# Patient Record
Sex: FEMALE | Race: WHITE | NOT HISPANIC OR LATINO
[De-identification: names, ages, dates, MRNs, and addresses within clinical notes are randomized per-mention and may not be internally consistent; named-entity substitution may affect disease eponyms.]

---

## 2017-07-26 ENCOUNTER — APPOINTMENT (OUTPATIENT)
Dept: OBGYN | Facility: CLINIC | Age: 47
End: 2017-07-26

## 2017-07-26 VITALS
SYSTOLIC BLOOD PRESSURE: 112 MMHG | HEIGHT: 66 IN | DIASTOLIC BLOOD PRESSURE: 74 MMHG | BODY MASS INDEX: 30.73 KG/M2 | WEIGHT: 191.2 LBS

## 2017-07-31 LAB
C TRACH RRNA SPEC QL NAA+PROBE: NORMAL
CYTOLOGY CVX/VAG DOC THIN PREP: NORMAL
HPV HIGH+LOW RISK DNA PNL CVX: NEGATIVE
N GONORRHOEA RRNA SPEC QL NAA+PROBE: NORMAL
SOURCE TP AMPLIFICATION: NORMAL

## 2017-08-16 DIAGNOSIS — N60.01 SOLITARY CYST OF RIGHT BREAST: ICD-10-CM

## 2018-07-25 ENCOUNTER — APPOINTMENT (OUTPATIENT)
Dept: OBGYN | Facility: CLINIC | Age: 48
End: 2018-07-25
Payer: COMMERCIAL

## 2018-07-25 VITALS
DIASTOLIC BLOOD PRESSURE: 60 MMHG | HEIGHT: 66 IN | SYSTOLIC BLOOD PRESSURE: 100 MMHG | BODY MASS INDEX: 31.18 KG/M2 | WEIGHT: 194 LBS

## 2018-07-25 DIAGNOSIS — Z80.3 FAMILY HISTORY OF MALIGNANT NEOPLASM OF BREAST: ICD-10-CM

## 2018-07-25 LAB
DATE COLLECTED: NORMAL
HEMOCCULT SP1 STL QL: NEGATIVE
QUALITY CONTROL: YES

## 2018-07-25 PROCEDURE — 99396 PREV VISIT EST AGE 40-64: CPT

## 2018-08-02 LAB
C TRACH RRNA SPEC QL NAA+PROBE: NOT DETECTED
CYTOLOGY CVX/VAG DOC THIN PREP: NORMAL
HPV HIGH+LOW RISK DNA PNL CVX: NOT DETECTED
N GONORRHOEA RRNA SPEC QL NAA+PROBE: NOT DETECTED
SOURCE TP AMPLIFICATION: NORMAL

## 2018-08-14 ENCOUNTER — OTHER (OUTPATIENT)
Age: 48
End: 2018-08-14

## 2018-08-15 ENCOUNTER — OTHER (OUTPATIENT)
Age: 48
End: 2018-08-15

## 2018-09-19 ENCOUNTER — APPOINTMENT (OUTPATIENT)
Dept: UROGYNECOLOGY | Facility: CLINIC | Age: 48
End: 2018-09-19

## 2019-06-27 ENCOUNTER — OTHER (OUTPATIENT)
Age: 49
End: 2019-06-27

## 2019-07-29 ENCOUNTER — APPOINTMENT (OUTPATIENT)
Dept: OBGYN | Facility: CLINIC | Age: 49
End: 2019-07-29
Payer: COMMERCIAL

## 2019-07-29 VITALS
WEIGHT: 173.04 LBS | HEIGHT: 65 IN | DIASTOLIC BLOOD PRESSURE: 79 MMHG | SYSTOLIC BLOOD PRESSURE: 110 MMHG | BODY MASS INDEX: 28.83 KG/M2

## 2019-07-29 LAB
HCG UR QL: NEGATIVE
QUALITY CONTROL: YES

## 2019-07-29 PROCEDURE — 81025 URINE PREGNANCY TEST: CPT

## 2019-07-29 PROCEDURE — 36415 COLL VENOUS BLD VENIPUNCTURE: CPT

## 2019-07-29 PROCEDURE — 99396 PREV VISIT EST AGE 40-64: CPT

## 2019-07-29 PROCEDURE — 99214 OFFICE O/P EST MOD 30 MIN: CPT | Mod: 25

## 2019-07-29 NOTE — REVIEW OF SYSTEMS
[Incontinence] : incontinence [Arthralgias] : arthralgias [Joint Pain] : joint pain [Nl] : Integumentary [FreeTextEntry1] : referred to Dr. Bernal

## 2019-07-29 NOTE — PHYSICAL EXAM
[Awake] : awake [Alert] : alert [Soft] : soft [Oriented x3] : oriented to person, place, and time [Normal] : cervix [No Bleeding] : there was no active vaginal bleeding [Labia Majora] : labia major [Labia Minora] : labia minora [Normal Position] : in a normal position [Uterine Adnexae] : were not tender and not enlarged [Acute Distress] : no acute distress [LAD] : no lymphadenopathy [Thyroid Nodule] : no thyroid nodule [Goiter] : no goiter [Mass] : no breast mass [Nipple Discharge] : no nipple discharge [Axillary LAD] : no axillary lymphadenopathy [Tender] : non tender [Distended] : not distended [H/Smegaly] : no hepatosplenomegaly [Depressed Mood] : not depressed [Flat Affect] : affect not flat [Tenderness] : nontender [Enlarged ___ wks] : not enlarged [Mass ___ cm] : no uterine mass was palpated [Adnexa Tenderness] : were not tender [Ovarian Mass (___ Cm)] : there were no adnexal masses [de-identified] : breast exam: supine and upright [FreeTextEntry9] : rectal deferred

## 2019-08-12 DIAGNOSIS — R92.1 MAMMOGRAPHIC CALCIFICATION FOUND ON DIAGNOSTIC IMAGING OF BREAST: ICD-10-CM

## 2019-08-19 DIAGNOSIS — Z13.71 ENCOUNTER FOR NONPROCREATIVE SCREENING FOR GENETIC DISEASE CARRIER STATUS: ICD-10-CM

## 2020-07-22 ENCOUNTER — APPOINTMENT (OUTPATIENT)
Dept: OBGYN | Facility: CLINIC | Age: 50
End: 2020-07-22
Payer: COMMERCIAL

## 2020-07-22 VITALS
BODY MASS INDEX: 25.39 KG/M2 | HEIGHT: 66 IN | WEIGHT: 158 LBS | DIASTOLIC BLOOD PRESSURE: 78 MMHG | SYSTOLIC BLOOD PRESSURE: 116 MMHG

## 2020-07-22 PROCEDURE — 99396 PREV VISIT EST AGE 40-64: CPT

## 2020-07-22 NOTE — PHYSICAL EXAM
[Alert] : alert [Awake] : awake [Soft] : soft [Oriented x3] : oriented to person, place, and time [Normal] : uterus [Labia Majora] : labia major [No Bleeding] : there was no active vaginal bleeding [Labia Minora] : labia minora [Normal Position] : in a normal position [Uterine Adnexae] : were not tender and not enlarged [Acute Distress] : no acute distress [Thyroid Nodule] : no thyroid nodule [LAD] : no lymphadenopathy [Goiter] : no goiter [Mass] : no breast mass [Nipple Discharge] : no nipple discharge [Axillary LAD] : no axillary lymphadenopathy [Distended] : not distended [Tender] : non tender [Depressed Mood] : not depressed [H/Smegaly] : no hepatosplenomegaly [Flat Affect] : affect not flat [Enlarged ___ wks] : not enlarged [Tenderness] : nontender [Mass ___ cm] : no uterine mass was palpated [Adnexa Tenderness] : were not tender [Ovarian Mass (___ Cm)] : there were no adnexal masses [FreeTextEntry9] : rectal deferred [de-identified] : breast exam: supine and upright

## 2021-08-10 ENCOUNTER — APPOINTMENT (OUTPATIENT)
Dept: OBGYN | Facility: CLINIC | Age: 51
End: 2021-08-10
Payer: COMMERCIAL

## 2021-08-10 ENCOUNTER — NON-APPOINTMENT (OUTPATIENT)
Age: 51
End: 2021-08-10

## 2021-08-10 VITALS
BODY MASS INDEX: 25.55 KG/M2 | HEIGHT: 66 IN | WEIGHT: 159 LBS | DIASTOLIC BLOOD PRESSURE: 76 MMHG | SYSTOLIC BLOOD PRESSURE: 124 MMHG

## 2021-08-10 LAB
HCG UR QL: NEGATIVE
QUALITY CONTROL: YES

## 2021-08-10 PROCEDURE — 99396 PREV VISIT EST AGE 40-64: CPT

## 2021-08-10 PROCEDURE — 81025 URINE PREGNANCY TEST: CPT

## 2021-08-10 NOTE — PHYSICAL EXAM
[Awake] : awake [Alert] : alert [Soft] : soft [Oriented x3] : oriented to person, place, and time [Normal] : uterus [Labia Majora] : labia major [Labia Minora] : labia minora [No Bleeding] : there was no active vaginal bleeding [Normal Position] : in a normal position [Uterine Adnexae] : were not tender and not enlarged [Acute Distress] : no acute distress [LAD] : no lymphadenopathy [Thyroid Nodule] : no thyroid nodule [Goiter] : no goiter [Mass] : no breast mass [Nipple Discharge] : no nipple discharge [Axillary LAD] : no axillary lymphadenopathy [Tender] : non tender [Distended] : not distended [H/Smegaly] : no hepatosplenomegaly [Depressed Mood] : not depressed [Flat Affect] : affect not flat [Tenderness] : nontender [Enlarged ___ wks] : not enlarged [Mass ___ cm] : no uterine mass was palpated [Adnexa Tenderness] : were not tender [Ovarian Mass (___ Cm)] : there were no adnexal masses [de-identified] : breast exam: supine and upright [FreeTextEntry9] : rectal deferred

## 2022-07-21 ENCOUNTER — APPOINTMENT (OUTPATIENT)
Dept: OBGYN | Facility: CLINIC | Age: 52
End: 2022-07-21

## 2022-07-21 ENCOUNTER — NON-APPOINTMENT (OUTPATIENT)
Age: 52
End: 2022-07-21

## 2022-07-21 VITALS
DIASTOLIC BLOOD PRESSURE: 68 MMHG | SYSTOLIC BLOOD PRESSURE: 122 MMHG | WEIGHT: 157 LBS | BODY MASS INDEX: 25.23 KG/M2 | HEIGHT: 66 IN

## 2022-07-21 DIAGNOSIS — R92.2 INCONCLUSIVE MAMMOGRAM: ICD-10-CM

## 2022-07-21 DIAGNOSIS — Z12.31 ENCOUNTER FOR SCREENING MAMMOGRAM FOR MALIGNANT NEOPLASM OF BREAST: ICD-10-CM

## 2022-07-21 DIAGNOSIS — N76.2 ACUTE VULVITIS: ICD-10-CM

## 2022-07-21 DIAGNOSIS — Z12.11 ENCOUNTER FOR SCREENING FOR MALIGNANT NEOPLASM OF COLON: ICD-10-CM

## 2022-07-21 PROCEDURE — 99396 PREV VISIT EST AGE 40-64: CPT

## 2022-07-21 RX ORDER — NYSTATIN AND TRIAMCINOLONE ACETONIDE 100000; 1 MG/G; MG/G
100000-0.1 CREAM TOPICAL TWICE DAILY
Qty: 1 | Refills: 0 | Status: DISCONTINUED | COMMUNITY
Start: 2018-07-25 | End: 2022-07-21

## 2022-07-21 RX ORDER — MESALAMINE 1.2 G/1
1.2 TABLET, DELAYED RELEASE ORAL
Refills: 0 | Status: ACTIVE | COMMUNITY

## 2022-07-21 RX ORDER — SUMATRIPTAN 50 MG/1
50 TABLET, FILM COATED ORAL
Qty: 36 | Refills: 0 | Status: ACTIVE | COMMUNITY
Start: 2022-03-12

## 2022-07-21 RX ORDER — ROSUVASTATIN CALCIUM 5 MG/1
5 TABLET, FILM COATED ORAL
Refills: 0 | Status: ACTIVE | COMMUNITY

## 2022-07-22 LAB — HPV HIGH+LOW RISK DNA PNL CVX: NOT DETECTED

## 2022-07-25 PROBLEM — Z12.11 SCREENING FOR COLON CANCER: Status: RESOLVED | Noted: 2018-07-25 | Resolved: 2022-07-25

## 2022-07-25 NOTE — HISTORY OF PRESENT ILLNESS
[N] : Patient does not use contraception [Y] : Positive pregnancy history [Menarche Age: ____] : age at menarche was [unfilled] [No] : Patient does not have concerns regarding sex [Previously active] : previously active [Men] : men [Mammogramdate] : 10/11/21 [TextBox_19] : BR2 [Papeardate] : 08/10/21 [ColonoscopyDate] : 2021 [GonorrheaDate] : 08/10/21 [TextBox_63] : NEG [ChlamydiaDate] : 08/10/21 [TextBox_68] : NEG [HPVDate] : 08/10/21 [TextBox_78] : NEG [LMPDate] : 06/26/22 [PGHxTotal] : 2 [[facility] [[facility]] : 2 [FreeTextEntry1] : 06/26/22

## 2022-07-25 NOTE — DISCUSSION/SUMMARY
[FreeTextEntry1] : Benign breast and pelvic exam. Pap done today.\par \par Prescription for mammogram screening and breast US given. I recommended to receive her breast imaging at a [facility]. \par \par Self-breast exam reviewed.\par \par She will follow up annually and as needed.\par

## 2022-07-25 NOTE — END OF VISIT
[FreeTextEntry3] : I, Demetrio Mejia solely acted as scribe for Dr. Sweta Matute on 07/21/2022 \par All medical entries made by the Scribe were at my, Dr. Matute’s, direction and personally\par dictated by me on 07/21/2022 . I have reviewed the chart and agree that the record\par accurately reflects my personal performance of the history, physical exam, assessment and plan. I\par have also personally directed, reviewed, and agreed with the chart.

## 2022-07-25 NOTE — PHYSICAL EXAM
[Chaperone Present] : A chaperone was present in the examining room during all aspects of the physical examination [Appropriately responsive] : appropriately responsive [Alert] : alert [No Acute Distress] : no acute distress [Soft] : soft [Non-tender] : non-tender [Non-distended] : non-distended [Oriented x3] : oriented x3 [Examination Of The Breasts] : a normal appearance [No Discharge] : no discharge [No Masses] : no breast masses were palpable [Labia Majora] : normal [Labia Minora] : normal [No Bleeding] : There was no active vaginal bleeding [Normal] : normal [Uterine Adnexae] : normal [FreeTextEntry1] : MOA: Yessi SESAY

## 2022-07-26 ENCOUNTER — NON-APPOINTMENT (OUTPATIENT)
Age: 52
End: 2022-07-26

## 2022-08-06 LAB — CYTOLOGY CVX/VAG DOC THIN PREP: NORMAL

## 2023-05-09 ENCOUNTER — OFFICE (OUTPATIENT)
Dept: URBAN - METROPOLITAN AREA CLINIC 1 | Facility: CLINIC | Age: 53
Setting detail: OPHTHALMOLOGY
End: 2023-05-09
Payer: COMMERCIAL

## 2023-05-09 DIAGNOSIS — H52.4: ICD-10-CM

## 2023-05-09 DIAGNOSIS — H17.9: ICD-10-CM

## 2023-05-09 DIAGNOSIS — H16.222: ICD-10-CM

## 2023-05-09 PROCEDURE — 92014 COMPRE OPH EXAM EST PT 1/>: CPT

## 2023-05-09 PROCEDURE — 92015 DETERMINE REFRACTIVE STATE: CPT

## 2023-05-09 ASSESSMENT — REFRACTION_AUTOREFRACTION
OD_AXIS: 094
OS_CYLINDER: -0.50
OS_SPHERE: +0.25
OD_CYLINDER: -1.00
OS_AXIS: 097
OD_SPHERE: +1.25

## 2023-05-09 ASSESSMENT — REFRACTION_MANIFEST
OD_CYLINDER: -0.50
OS_AXIS: 95
OS_SPHERE: PLANO
OS_CYLINDER: -0.50
OS_CYLINDER: SPHERE
OD_SPHERE: PLANO
OS_ADD: +1.75
OS_SPHERE: PLANO
OS_VA1: 20/20
OD_AXIS: 95
OD_ADD: +1.75
OD_ADD: +2.00
OS_VA1: 20/20
OD_CYLINDER: SPHERE
OD_VA1: 20/20
OS_ADD: +2.00
OD_SPHERE: +0.50
OD_VA1: 20/20

## 2023-05-09 ASSESSMENT — AXIALLENGTH_DERIVED
OD_AL: 23.4384
OS_AL: 23.777
OD_AL: 23.6323

## 2023-05-09 ASSESSMENT — CONFRONTATIONAL VISUAL FIELD TEST (CVF)
OS_FINDINGS: FULL
OD_FINDINGS: FULL

## 2023-05-09 ASSESSMENT — KERATOMETRY
OD_AXISANGLE_DEGREES: 002
OD_K2POWER_DIOPTERS: 43.50
OD_K1POWER_DIOPTERS: 42.75
OS_AXISANGLE_DEGREES: 171
OS_K1POWER_DIOPTERS: 42.50
OS_K2POWER_DIOPTERS: 43.50

## 2023-05-09 ASSESSMENT — SPHEQUIV_DERIVED
OS_SPHEQUIV: 0
OD_SPHEQUIV: 0.75
OD_SPHEQUIV: 0.25

## 2023-05-09 ASSESSMENT — VISUAL ACUITY
OD_BCVA: 20/25-
OS_BCVA: 20/25

## 2023-05-09 ASSESSMENT — SUPERFICIAL PUNCTATE KERATITIS (SPK): OS_SPK: T

## 2023-06-21 PROBLEM — Z79.52 LONG TERM SYSTEMIC STEROID USER: Status: RESOLVED | Noted: 2023-06-21 | Resolved: 2023-06-21

## 2023-06-21 PROBLEM — Z83.511 FAMILY HISTORY OF GLAUCOMA: Status: ACTIVE | Noted: 2023-06-21

## 2023-06-21 PROBLEM — Z87.828 HISTORY OF BACK INJURY: Status: RESOLVED | Noted: 2023-06-21 | Resolved: 2023-06-21

## 2023-06-21 PROBLEM — Z87.09 HISTORY OF BRONCHITIS: Status: RESOLVED | Noted: 2023-06-21 | Resolved: 2023-06-21

## 2023-06-21 PROBLEM — Z82.3 FAMILY HISTORY OF CEREBROVASCULAR ACCIDENT (CVA): Status: ACTIVE | Noted: 2023-06-21

## 2023-06-27 ENCOUNTER — RESULT CHARGE (OUTPATIENT)
Age: 53
End: 2023-06-27

## 2023-06-27 ENCOUNTER — APPOINTMENT (OUTPATIENT)
Dept: UROGYNECOLOGY | Facility: CLINIC | Age: 53
End: 2023-06-27
Payer: COMMERCIAL

## 2023-06-27 VITALS
HEIGHT: 66 IN | DIASTOLIC BLOOD PRESSURE: 70 MMHG | SYSTOLIC BLOOD PRESSURE: 108 MMHG | BODY MASS INDEX: 25.23 KG/M2 | WEIGHT: 157 LBS

## 2023-06-27 DIAGNOSIS — Z87.828 PERSONAL HISTORY OF OTHER (HEALED) PHYSICAL INJURY AND TRAUMA: ICD-10-CM

## 2023-06-27 DIAGNOSIS — Z87.09 PERSONAL HISTORY OF OTHER DISEASES OF THE RESPIRATORY SYSTEM: ICD-10-CM

## 2023-06-27 DIAGNOSIS — N36.42 INTRINSIC SPHINCTER DEFICIENCY (ISD): ICD-10-CM

## 2023-06-27 DIAGNOSIS — Z82.3 FAMILY HISTORY OF STROKE: ICD-10-CM

## 2023-06-27 DIAGNOSIS — Z83.511 FAMILY HISTORY OF GLAUCOMA: ICD-10-CM

## 2023-06-27 DIAGNOSIS — Z79.52 LONG TERM (CURRENT) USE OF SYSTEMIC STEROIDS: ICD-10-CM

## 2023-06-27 LAB
BILIRUB UR QL STRIP: NEGATIVE
CLARITY UR: CLEAR
COLLECTION METHOD: NORMAL
GLUCOSE UR-MCNC: NEGATIVE
HCG UR QL: 0.2 EU/DL
HGB UR QL STRIP.AUTO: NEGATIVE
KETONES UR-MCNC: NEGATIVE
LEUKOCYTE ESTERASE UR QL STRIP: NEGATIVE
NITRITE UR QL STRIP: NEGATIVE
PH UR STRIP: 5.5
PROT UR STRIP-MCNC: NEGATIVE
SP GR UR STRIP: 1.01

## 2023-06-27 PROCEDURE — 99204 OFFICE O/P NEW MOD 45 MIN: CPT | Mod: 25

## 2023-06-27 PROCEDURE — 99214 OFFICE O/P EST MOD 30 MIN: CPT | Mod: 25

## 2023-06-27 PROCEDURE — 51701 INSERT BLADDER CATHETER: CPT | Mod: 59

## 2023-06-27 PROCEDURE — 81003 URINALYSIS AUTO W/O SCOPE: CPT | Mod: QW

## 2023-06-27 NOTE — PHYSICAL EXAM
[Chaperone Present] : A chaperone was present in the examining room during all aspects of the physical examination [No Acute Distress] : in no acute distress [Well developed] : well developed [Well Nourished] : ~L well nourished [Oriented x3] : oriented to person, place, and time [Normal Memory] : ~T memory was ~L unimpaired [Normal Mood/Affect] : mood and affect are normal [No Edema] : ~T edema was not present [None] : no CVA tenderness [Warm and Dry] : was warm and dry to touch [Normal Gait] : gait was normal [Labia Majora] : were normal [Labia Minora] : were normal [Normal Appearance] : general appearance was normal [2] : 2 [Normal] : no abnormalities [Post Void Residual ____ml] : post void residual was [unfilled] ml [Cough] : no cough [Tenderness] : ~T no ~M abdominal tenderness observed [Distended] : not distended [Inguinal LAD] : no adenopathy was noted in the inguinal lymph nodes [FreeTextEntry3] : positive CST [de-identified] : no POP

## 2023-06-27 NOTE — REASON FOR VISIT
[Questionnaire Received] : Patient questionnaire received [Urinary Incontinence] : urinary incontinence [Initial Visit ___] : an initial visit for [unfilled]

## 2023-06-27 NOTE — PROCEDURE
[Straight Catheterization] : insertion of a straight catheter [Urinary Frequency] : urinary frequency [Patient] : the patient [___ Fr Straight Tip] : a [unfilled] in Hungarian straight tip catheter [None] : there were no complications with the catheter insertion [Clear] : clear [No Complications] : no complications [Tolerated Well] : the patient tolerated the procedure well [Post procedure instructions and information given] : Post procedure instructions and information were given and reviewed with patient. [0] : 0

## 2023-06-27 NOTE — DISCUSSION/SUMMARY
[FreeTextEntry1] : \par Allyson presents for eval of DEB, positive CST, no POP, normal PVR.  We discussed management options including observation, pelvic floor exercises with or without physical therapy, pessary, impressa, medications including imipramine and surgical management including urethral bulking agents, fascial sling, fish and midurethral sling with mesh. IUGA DEB/PFE given to her.\par \par [] UDS\par [] sling/cysto\par

## 2023-06-27 NOTE — HISTORY OF PRESENT ILLNESS
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

## 2023-07-13 ENCOUNTER — APPOINTMENT (OUTPATIENT)
Dept: UROGYNECOLOGY | Facility: CLINIC | Age: 53
End: 2023-07-13
Payer: COMMERCIAL

## 2023-07-13 PROCEDURE — 51784 ANAL/URINARY MUSCLE STUDY: CPT

## 2023-07-13 PROCEDURE — 51741 ELECTRO-UROFLOWMETRY FIRST: CPT

## 2023-07-13 PROCEDURE — 51797 INTRAABDOMINAL PRESSURE TEST: CPT

## 2023-07-13 PROCEDURE — 51729 CYSTOMETROGRAM W/VP&UP: CPT

## 2023-07-18 ENCOUNTER — APPOINTMENT (OUTPATIENT)
Dept: UROGYNECOLOGY | Facility: CLINIC | Age: 53
End: 2023-07-18
Payer: COMMERCIAL

## 2023-07-18 DIAGNOSIS — N39.3 STRESS INCONTINENCE (FEMALE) (MALE): ICD-10-CM

## 2023-07-18 PROCEDURE — 99214 OFFICE O/P EST MOD 30 MIN: CPT

## 2023-07-18 NOTE — HISTORY OF PRESENT ILLNESS
[Vaginal Wall Prolapse] : no [Rectal Prolapse] : no [Unable To Restrain Bowel Movement] : mild [x1] : nocturia once nightly [Feelings Of Urinary Urgency] : no [Pain During Urination (Dysuria)] : no [Urinary Tract Infection] : moderate [Constipation Obstructed Defecation] : no [] : no [Incomplete Emptying Of Stool] : no [Pelvic Pain] : no [Vaginal Pain] : no [Vulvar Pain] : no [FreeTextEntry1] : \par Allyson presents for eval of DEB\par UDS with DEB\par desires surgery

## 2023-07-18 NOTE — DISCUSSION/SUMMARY
[FreeTextEntry1] : \par Allyson presents for eval of DEB.\par \par The patient presented to the office today for counseling regarding her decision for surgical treatment of her urinary incontinence. All pertinent prior studies including urodynamic testing were reviewed. 						\par The patient was counseled regarding alternative non-surgical therapies as well as the prognosis with no intervention.\par \par The patient was advised regarding various surgical options including abdominal, laparoscopic, transurethral and vaginal approaches, allograft (harvesting one’s own tissue), xenograft (using tissue from donor) and synthetic grafts.  The risks and benefits of surgery using graft insertion (mesh) were fully reviewed.  She was informed of the potential for improved durability and the inherent risk of mesh use including but not limited to infection, erosion and chronic inflammation, acute and chronic pain, pain with intercourse (both of which may be refractory to treatment) fistula, disturbance in bladder function, any of which may require additional surgery for mesh revision.  She is aware that the mesh used in her surgery is a permanent mesh.  The patient was advised regarding the July 2011 FDA notification regarding these issues and provided the website address for further reference www.fda.gov.  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  Appropriate informed consent was obtained.  All questions were answered to the patient’s satisfaction.\par \par Preoperative labs ordered including CBC/BMP/PT/PT/INR\par IUGA MUS given\par \par [] consent for pelvic EUA, sling/cysto, any other indicated procedures\par

## 2023-07-18 NOTE — PHYSICAL EXAM
[Chaperone Present] : A chaperone was present in the examining room during all aspects of the physical examination [Cough] : no cough

## 2023-08-03 ENCOUNTER — NON-APPOINTMENT (OUTPATIENT)
Age: 53
End: 2023-08-03

## 2023-08-03 ENCOUNTER — APPOINTMENT (OUTPATIENT)
Dept: OBGYN | Facility: CLINIC | Age: 53
End: 2023-08-03
Payer: COMMERCIAL

## 2023-08-03 VITALS
SYSTOLIC BLOOD PRESSURE: 118 MMHG | WEIGHT: 160 LBS | BODY MASS INDEX: 25.71 KG/M2 | HEIGHT: 66 IN | DIASTOLIC BLOOD PRESSURE: 68 MMHG

## 2023-08-03 DIAGNOSIS — N64.9 DISORDER OF BREAST, UNSPECIFIED: ICD-10-CM

## 2023-08-03 DIAGNOSIS — N63.0 UNSPECIFIED LUMP IN UNSPECIFIED BREAST: ICD-10-CM

## 2023-08-03 DIAGNOSIS — Z13.820 ENCOUNTER FOR SCREENING FOR OSTEOPOROSIS: ICD-10-CM

## 2023-08-03 DIAGNOSIS — R92.2 INCONCLUSIVE MAMMOGRAM: ICD-10-CM

## 2023-08-03 DIAGNOSIS — Z01.419 ENCOUNTER FOR GYNECOLOGICAL EXAMINATION (GENERAL) (ROUTINE) W/OUT ABNORMAL FINDINGS: ICD-10-CM

## 2023-08-03 DIAGNOSIS — Z12.31 ENCOUNTER FOR SCREENING MAMMOGRAM FOR MALIGNANT NEOPLASM OF BREAST: ICD-10-CM

## 2023-08-03 PROCEDURE — 99396 PREV VISIT EST AGE 40-64: CPT

## 2023-08-04 ENCOUNTER — OFFICE (OUTPATIENT)
Dept: URBAN - METROPOLITAN AREA CLINIC 1 | Facility: CLINIC | Age: 53
Setting detail: OPHTHALMOLOGY
End: 2023-08-04
Payer: COMMERCIAL

## 2023-08-04 DIAGNOSIS — T15.12XA: ICD-10-CM

## 2023-08-04 DIAGNOSIS — H16.222: ICD-10-CM

## 2023-08-04 PROBLEM — N63.0 BREAST NODULE: Status: RESOLVED | Noted: 2018-02-26 | Resolved: 2023-08-04

## 2023-08-04 PROBLEM — N64.9 BREAST LESION: Status: RESOLVED | Noted: 2018-08-14 | Resolved: 2023-08-04

## 2023-08-04 LAB — HPV HIGH+LOW RISK DNA PNL CVX: NOT DETECTED

## 2023-08-04 PROCEDURE — 65205 REMOVE FOREIGN BODY FROM EYE: CPT

## 2023-08-04 PROCEDURE — 99203 OFFICE O/P NEW LOW 30 MIN: CPT

## 2023-08-04 ASSESSMENT — KERATOMETRY
OS_K1POWER_DIOPTERS: 42.75
OS_K2POWER_DIOPTERS: 44.00
OD_K1POWER_DIOPTERS: 43.00
OS_AXISANGLE_DEGREES: 150
OD_AXISANGLE_DEGREES: 175
OD_K2POWER_DIOPTERS: 43.50

## 2023-08-04 ASSESSMENT — CONFRONTATIONAL VISUAL FIELD TEST (CVF)
OD_FINDINGS: FULL
OS_FINDINGS: FULL

## 2023-08-04 ASSESSMENT — VISUAL ACUITY
OS_BCVA: 20/20-
OD_BCVA: 20/20-1

## 2023-08-04 ASSESSMENT — SUPERFICIAL PUNCTATE KERATITIS (SPK): OS_SPK: 2+

## 2023-08-04 NOTE — PHYSICAL EXAM
[Appropriately responsive] : appropriately responsive [Alert] : alert [No Acute Distress] : no acute distress [Oriented x3] : oriented x3 [Examination Of The Breasts] : a normal appearance [No Discharge] : no discharge [No Masses] : no breast masses were palpable [Labia Majora] : normal [Labia Minora] : normal [No Bleeding] : There was no active vaginal bleeding [Normal] : normal [Uterine Adnexae] : normal [Soft] : soft [Non-tender] : non-tender [Non-distended] : non-distended [Atrophy] : atrophy [Dry Mucosa] : dry mucosa

## 2023-08-04 NOTE — END OF VISIT
[FreeTextEntry3] : I, Porsche Aguilar, solely acted as scribe for Dr. Sweta Matute on 08/03/2023. All medical entries made by the Scribe were at my, Dr. Matute's, direction and personally dictated by me on 08/03/2023. I have reviewed the chart and agree that the record accurately reflects my personal performance of the history, physical exam, assessment and plan. I have also personally directed, reviewed, and agreed with the chart.

## 2023-08-04 NOTE — DISCUSSION/SUMMARY
[FreeTextEntry1] : Pap done today.  Consider vaginal estrogen secondary to incontinence and painful sex/vaginal dryness. SHe will discuss with Dr RAUSCH and consider for after her surgery.  Prescription for mammogram screening, breast US, and DEXA scan given.  Recommended calcium and vitamin D supplement.  Self-breast exam reviewed.  She will follow up annually and as needed.

## 2023-08-04 NOTE — HISTORY OF PRESENT ILLNESS
[Patient reported bone density results were normal] : Patient reported bone density results were normal [N] : Patient does not use contraception [Y] : Positive pregnancy history [Menarche Age: ____] : age at menarche was [unfilled] [No] : Patient does not have concerns regarding sex [Currently Active] : currently active [Mammogramdate] : 10/12/22 [TextBox_19] : br2 [PapSmeardate] : 07/21/22 [TextBox_31] : neg [BoneDensityDate] : 8/23/2019 [ColonoscopyDate] : 01/2022 [GonorrheaDate] : 08/10/21 [TextBox_63] : neg [ChlamydiaDate] : 08/10/21 [TextBox_68] : neg [HPVDate] : 07/21/22 [TextBox_78] : neg [LMPDate] : 6/22/23 [PGHxTotal] : 2 [[facility] : 2 [[facility] 2 [FreeTextEntry1] : 6/22/23

## 2023-08-07 ENCOUNTER — APPOINTMENT (OUTPATIENT)
Dept: UROGYNECOLOGY | Facility: CLINIC | Age: 53
End: 2023-08-07

## 2023-08-08 ENCOUNTER — APPOINTMENT (OUTPATIENT)
Dept: UROGYNECOLOGY | Facility: CLINIC | Age: 53
End: 2023-08-08

## 2023-08-15 ENCOUNTER — OUTPATIENT (OUTPATIENT)
Dept: OUTPATIENT SERVICES | Facility: HOSPITAL | Age: 53
LOS: 1 days | End: 2023-08-15
Payer: COMMERCIAL

## 2023-08-15 VITALS
SYSTOLIC BLOOD PRESSURE: 120 MMHG | OXYGEN SATURATION: 98 % | HEIGHT: 66 IN | HEART RATE: 75 BPM | WEIGHT: 162.7 LBS | TEMPERATURE: 97 F | RESPIRATION RATE: 18 BRPM | DIASTOLIC BLOOD PRESSURE: 70 MMHG

## 2023-08-15 DIAGNOSIS — N36.42 INTRINSIC SPHINCTER DEFICIENCY (ISD): ICD-10-CM

## 2023-08-15 DIAGNOSIS — Z98.890 OTHER SPECIFIED POSTPROCEDURAL STATES: Chronic | ICD-10-CM

## 2023-08-15 DIAGNOSIS — N39.3 STRESS INCONTINENCE (FEMALE) (MALE): ICD-10-CM

## 2023-08-15 DIAGNOSIS — Z29.9 ENCOUNTER FOR PROPHYLACTIC MEASURES, UNSPECIFIED: ICD-10-CM

## 2023-08-15 DIAGNOSIS — Z01.818 ENCOUNTER FOR OTHER PREPROCEDURAL EXAMINATION: ICD-10-CM

## 2023-08-15 DIAGNOSIS — Z90.49 ACQUIRED ABSENCE OF OTHER SPECIFIED PARTS OF DIGESTIVE TRACT: Chronic | ICD-10-CM

## 2023-08-15 DIAGNOSIS — Z13.89 ENCOUNTER FOR SCREENING FOR OTHER DISORDER: ICD-10-CM

## 2023-08-15 LAB
A1C WITH ESTIMATED AVERAGE GLUCOSE RESULT: 6 % — HIGH (ref 4–5.6)
ANION GAP SERPL CALC-SCNC: 7 MMOL/L — SIGNIFICANT CHANGE UP (ref 5–17)
APPEARANCE UR: CLEAR — SIGNIFICANT CHANGE UP
APTT BLD: 29.2 SEC — SIGNIFICANT CHANGE UP (ref 24.5–35.6)
BASOPHILS # BLD AUTO: 0.04 K/UL — SIGNIFICANT CHANGE UP (ref 0–0.2)
BASOPHILS NFR BLD AUTO: 0.6 % — SIGNIFICANT CHANGE UP (ref 0–2)
BILIRUB UR-MCNC: NEGATIVE — SIGNIFICANT CHANGE UP
BLD GP AB SCN SERPL QL: SIGNIFICANT CHANGE UP
BUN SERPL-MCNC: 19.5 MG/DL — SIGNIFICANT CHANGE UP (ref 8–20)
CALCIUM SERPL-MCNC: 9.2 MG/DL — SIGNIFICANT CHANGE UP (ref 8.4–10.5)
CHLORIDE SERPL-SCNC: 104 MMOL/L — SIGNIFICANT CHANGE UP (ref 96–108)
CO2 SERPL-SCNC: 28 MMOL/L — SIGNIFICANT CHANGE UP (ref 22–29)
COLOR SPEC: YELLOW — SIGNIFICANT CHANGE UP
CREAT SERPL-MCNC: 0.58 MG/DL — SIGNIFICANT CHANGE UP (ref 0.5–1.3)
DIFF PNL FLD: NEGATIVE — SIGNIFICANT CHANGE UP
EGFR: 107 ML/MIN/1.73M2 — SIGNIFICANT CHANGE UP
EOSINOPHIL # BLD AUTO: 0.06 K/UL — SIGNIFICANT CHANGE UP (ref 0–0.5)
EOSINOPHIL NFR BLD AUTO: 0.9 % — SIGNIFICANT CHANGE UP (ref 0–6)
ESTIMATED AVERAGE GLUCOSE: 126 MG/DL — HIGH (ref 68–114)
GLUCOSE SERPL-MCNC: 98 MG/DL — SIGNIFICANT CHANGE UP (ref 70–99)
GLUCOSE UR QL: NEGATIVE MG/DL — SIGNIFICANT CHANGE UP
HCG SERPL-ACNC: <4 MIU/ML — SIGNIFICANT CHANGE UP
HCT VFR BLD CALC: 37.4 % — SIGNIFICANT CHANGE UP (ref 34.5–45)
HGB BLD-MCNC: 11.7 G/DL — SIGNIFICANT CHANGE UP (ref 11.5–15.5)
IMM GRANULOCYTES NFR BLD AUTO: 0.4 % — SIGNIFICANT CHANGE UP (ref 0–0.9)
INR BLD: 0.96 RATIO — SIGNIFICANT CHANGE UP (ref 0.85–1.18)
KETONES UR-MCNC: NEGATIVE — SIGNIFICANT CHANGE UP
LEUKOCYTE ESTERASE UR-ACNC: NEGATIVE — SIGNIFICANT CHANGE UP
LYMPHOCYTES # BLD AUTO: 1.58 K/UL — SIGNIFICANT CHANGE UP (ref 1–3.3)
LYMPHOCYTES # BLD AUTO: 23.3 % — SIGNIFICANT CHANGE UP (ref 13–44)
MCHC RBC-ENTMCNC: 26.7 PG — LOW (ref 27–34)
MCHC RBC-ENTMCNC: 31.3 GM/DL — LOW (ref 32–36)
MCV RBC AUTO: 85.2 FL — SIGNIFICANT CHANGE UP (ref 80–100)
MONOCYTES # BLD AUTO: 0.46 K/UL — SIGNIFICANT CHANGE UP (ref 0–0.9)
MONOCYTES NFR BLD AUTO: 6.8 % — SIGNIFICANT CHANGE UP (ref 2–14)
NEUTROPHILS # BLD AUTO: 4.6 K/UL — SIGNIFICANT CHANGE UP (ref 1.8–7.4)
NEUTROPHILS NFR BLD AUTO: 68 % — SIGNIFICANT CHANGE UP (ref 43–77)
NITRITE UR-MCNC: NEGATIVE — SIGNIFICANT CHANGE UP
PH UR: 6.5 — SIGNIFICANT CHANGE UP (ref 5–8)
PLATELET # BLD AUTO: 295 K/UL — SIGNIFICANT CHANGE UP (ref 150–400)
POTASSIUM SERPL-MCNC: 5 MMOL/L — SIGNIFICANT CHANGE UP (ref 3.5–5.3)
POTASSIUM SERPL-SCNC: 5 MMOL/L — SIGNIFICANT CHANGE UP (ref 3.5–5.3)
PROT UR-MCNC: NEGATIVE — SIGNIFICANT CHANGE UP
PROTHROM AB SERPL-ACNC: 10.7 SEC — SIGNIFICANT CHANGE UP (ref 9.5–13)
RBC # BLD: 4.39 M/UL — SIGNIFICANT CHANGE UP (ref 3.8–5.2)
RBC # FLD: 15 % — HIGH (ref 10.3–14.5)
SODIUM SERPL-SCNC: 139 MMOL/L — SIGNIFICANT CHANGE UP (ref 135–145)
SP GR SPEC: 1.01 — SIGNIFICANT CHANGE UP (ref 1.01–1.02)
UROBILINOGEN FLD QL: NEGATIVE MG/DL — SIGNIFICANT CHANGE UP
WBC # BLD: 6.77 K/UL — SIGNIFICANT CHANGE UP (ref 3.8–10.5)
WBC # FLD AUTO: 6.77 K/UL — SIGNIFICANT CHANGE UP (ref 3.8–10.5)

## 2023-08-15 PROCEDURE — 93005 ELECTROCARDIOGRAM TRACING: CPT

## 2023-08-15 PROCEDURE — G0463: CPT

## 2023-08-15 PROCEDURE — 93010 ELECTROCARDIOGRAM REPORT: CPT

## 2023-08-15 RX ORDER — SUMATRIPTAN SUCCINATE 4 MG/.5ML
1 INJECTION, SOLUTION SUBCUTANEOUS
Refills: 0 | DISCHARGE

## 2023-08-15 RX ORDER — MESALAMINE 400 MG
2 TABLET, DELAYED RELEASE (ENTERIC COATED) ORAL
Refills: 0 | DISCHARGE

## 2023-08-15 RX ORDER — OMEPRAZOLE 10 MG/1
1 CAPSULE, DELAYED RELEASE ORAL
Refills: 0 | DISCHARGE

## 2023-08-15 RX ORDER — CEFAZOLIN SODIUM 1 G
2000 VIAL (EA) INJECTION ONCE
Refills: 0 | Status: DISCONTINUED | OUTPATIENT
Start: 2023-08-23 | End: 2023-08-23

## 2023-08-15 NOTE — H&P PST ADULT - NSICDXFAMILYHX_GEN_ALL_CORE_FT
FAMILY HISTORY:  Father  Still living? Unknown  Family history of high cholesterol, Age at diagnosis: Age Unknown  FH: stroke, Age at diagnosis: Age Unknown    Mother  Still living? Unknown  FH: HTN (hypertension), Age at diagnosis: Age Unknown  FH: thyroid disease, Age at diagnosis: Age Unknown  FHx: heart disease, Age at diagnosis: Age Unknown

## 2023-08-15 NOTE — H&P PST ADULT - NSANTHOSAYNRD_GEN_A_CORE
No. HONEY screening performed.  STOP BANG Legend: 0-2 = LOW Risk; 3-4 = INTERMEDIATE Risk; 5-8 = HIGH Risk

## 2023-08-15 NOTE — H&P PST ADULT - NEUROLOGICAL COMMENTS
bilateral tingling and numbness to both feet- had mri of spine was negative as per patient, pinched nerves in feet

## 2023-08-15 NOTE — H&P PST ADULT - MUSCULOSKELETAL
details… ROM intact/no calf tenderness/normal gait/strength 5/5 bilateral upper extremities/strength 5/5 bilateral lower extremities/back exam

## 2023-08-15 NOTE — H&P PST ADULT - NSICDXPASTSURGICALHX_GEN_ALL_CORE_FT
PAST SURGICAL HISTORY:  History of appendectomy     History of bunionectomy of left great toe     S/P bilateral breast lumpectomy     S/P LASIK surgery of both eyes

## 2023-08-15 NOTE — H&P PST ADULT - ASSESSMENT
This is a pleasant 53 year old  female presenting today for PST, PMH includes UC, OA, GERD and migraines. Patient c/o urinary incontinence. Reports symptoms present for years but have worsened. States she has to void every 45 minutes and even still has leakage at times. She utilizes pads to prevent leaking onto clothing. She is very active and reports leaking with exercise, coughing, sneezing and laughing. Denies dysuria, hematuria, pelvic pain or recent UTI. Denies fever, chills, chest pain, shortness of breath or palpitations. She is now scheduled for midurethral sling, cystoscopy on 2023 with Dr. Crowe pending medical clearance.      CAPRINI SCORE    AGE RELATED RISK FACTORS                                                             [X ] Age 41-60 years                                            (1 Point)  [ ] Age: 61-74 years                                           (2 Points)                 [ ] Age= 75 years                                                (3 Points)             DISEASE RELATED RISK FACTORS                                                       [ ] Edema in the lower extremities                 (1 Point)                     [ X] Varicose veins                                               (1 Point)                                 [ X] BMI > 25 Kg/m2                                            (1 Point)                                  [ ] Serious infection (ie PNA)                            (1 Point)                     [ ] Lung disease ( COPD, Emphysema)            (1 Point)                                                                          [ ] Acute myocardial infarction                         (1 Point)                  [ ] Congestive heart failure (in the previous month)  (1 Point)         [ ] Inflammatory bowel disease                            (1 Point)                  [ ] Central venous access, PICC or Port               (2 points)       (within the last month)                                                                [ ] Stroke (in the previous month)                        (5 Points)    [ ] Previous or present malignancy                       (2 points)                                                                                                                                                         HEMATOLOGY RELATED FACTORS                                                         [ ] Prior episodes of VTE                                     (3 Points)                     [ ] Positive family history for VTE                      (3 Points)                  [ ] Prothrombin 64190 A                                     (3 Points)                     [ ] Factor V Leiden                                                (3 Points)                        [ ] Lupus anticoagulants                                      (3 Points)                                                           [ ] Anticardiolipin antibodies                              (3 Points)                                                       [ ] High homocysteine in the blood                   (3 Points)                                             [ ] Other congenital or acquired thrombophilia      (3 Points)                                                [ ] Heparin induced thrombocytopenia                  (3 Points)                                        MOBILITY RELATED FACTORS  [ ] Bed rest                                                         (1 Point)  [ ] Plaster cast                                                    (2 points)  [ ] Bed bound for more than 72 hours           (2 Points)    GENDER SPECIFIC FACTORS  [ ] Pregnancy or had a baby within the last month   (1 Point)  [ ] Post-partum < 6 weeks                                   (1 Point)  [ ] Hormonal therapy  or oral contraception   (1 Point)  [ ] History of pregnancy complications              (1 point)  [ ] Unexplained or recurrent              (1 Point)    OTHER RISK FACTORS                                           (1 Point)  [ ] BMI >40, smoking, diabetes requiring insulin, chemotherapy  blood transfusions and length of surgery over 2 hours    SURGERY RELATED RISK FACTORS  [ ]  Section within the last month     (1 Point)  [ ] Minor surgery                                                  (1 Point)  [ ] Arthroscopic surgery                                       (2 Points)  [X ] Planned major surgery lasting more            (2 Points)      than 45 minutes     [ ] Elective hip or knee joint replacement       (5 points)       surgery                                                TRAUMA RELATED RISK FACTORS  [ ] Fracture of the hip, pelvis, or leg                       (5 Points)  [ ] Spinal cord injury resulting in paralysis             (5 points)       (in the previous month)    [ ] Paralysis  (less than 1 month)                             (5 Points)  [ ] Multiple Trauma within 1 month                        (5 Points)    Total Score [     5   ]    Caprini Score 0-2: Low Risk, NO VTE prophylaxis required for most patients, encourage ambulation  Caprini Score 3-6: Moderate Risk , pharmacologic VTE prophylaxis is indicated for most patients (in the absence of contraindications)  Caprini Score Greater than or =7: High risk, pharmocologic VTE prophylaxis indicated for most patients (in the absence of contraindications)    OPIOID RISK TOOL    MORAIMA EACH BOX THAT APPLIES AND ADD TOTALS AT THE END    FAMILY HISTORY OF SUBSTANCE ABUSE                 FEMALE         MALE                                                Alcohol                             [  ]1 pt          [  ]3pts                                               Illegal Durgs                     [  ]2 pts        [  ]3pts                                               Rx Drugs                           [  ]4 pts        [  ]4 pts    PERSONAL HISTORY OF SUBSTANCE ABUSE                                                                                          Alcohol                             [  ]3 pts       [  ]3 pts                                               Illegal Drugs                     [  ]4 pts        [  ]4 pts                                               Rx Drugs                           [  ]5 pts        [  ]5 pts    AGE BETWEEN 16-45 YEARS                                      [  ]1 pt         [  ]1 pt    HISTORY OF PREADOLESCENT   SEXUAL ABUSE                                                             [  ]3 pts        [  ]0pts    PSYCHOLOGICAL DISEASE                     ADD, OCD, Bipolar, Schizophrenia        [  ]2 pts         [  ]2 pts                      Depression                                               [  ]1 pt           [  ]1 pt           SCORING TOTAL   (add numbers and type here)              (**0*)                                     A score of 3 or lower indicated LOW risk for future opioid abuse  A score of 4 to 7 indicated moderate risk for future opioid abuse  A score of 8 or higher indicates a high risk for opioid abuse

## 2023-08-15 NOTE — H&P PST ADULT - PROBLEM SELECTOR PLAN 1
Labs, EKG, UA and U C&S performed.  Scheduled for midurethral sling, cystoscopy on 8/23/2023 with Dr. Crowe pending medical clearance.  Patient to have medical clearance with Dr. RAUSCH  Written and verbal instructions provided.   Patient educated on liquids before surgery, preadmission instructions, clearance and day of procedure medications, verbalizes understanding.  Patient instructed to stop vitamins/supplements/herbal medications/ASA/NSAIDS for one week prior to surgery and discuss with PMD, verbalized understanding.  Patient instructed to speak with surgeon if any new medications started, verbalized understanding.  Patient verbalized understanding of instructions and was given the opportunity to ask questions and have them answered.  Out patient medications reviewed  and verified with patient.

## 2023-08-15 NOTE — H&P PST ADULT - NSICDXPASTMEDICALHX_GEN_ALL_CORE_FT
PAST MEDICAL HISTORY:  GERD (gastroesophageal reflux disease)     H/O ulcerative colitis     Migraines     OA (osteoarthritis)

## 2023-08-15 NOTE — H&P PST ADULT - BMI (KG/M2)
7/5/2017    Sandra presents to the clinic for her anticoagulation visit at the [facility].    (Z79.01) Long term (current) use of anticoagulants  (primary encounter diagnosis)    (M16.11) Primary osteoarthritis of right hip  Plan: INR - POINT OF CARE    (Z51.81) Encounter for therapeutic drug monitoring      INR Results  INR (no units)   Date Value   06/29/2017 1.8   06/14/2017 1.2     INR-POC (no units)   Date Value   07/05/2017 1.7       Status  Patient denies missing any doses or taking extra doses of warfarin. Patient is at her goal between 1.7 and 2.2.    Dietary  Patient denies any dietary changes and is aware of effects of Vitamin K rich food. Discussed with patient how important it is to be consistent with Vitamin K foods and she verbalized understanding.    Medication  All medications were reviewed including supplements, with no changes reported by patient. Dose of warfarin verified and patient currently taking it as prescribed.    Alcohol  Patient denies any alcohol consumption.    Review of Systems  SKIN: Denies any bruising or bleeding. No bruising or bleeding noted on patient when she was in the clinic today.  PSYCHOLOGIC: Alert. Oriented x3 to person, place, and time. Patient is self-administering medication.  OTHER: Patient denies noting any blood in Stool, Urine, Gums, or Nose.    Plan of Care  Patient is within her goal with no changes, and will continue current dosing. Per ortho notes dated 6/27/17, patient may discontinue warfarin 7/12/17 and begin taking aspirin 325 mg every 12 hours. Hard copy of dosing instructions was provided to patient, as is documented in today's anticoagulation encounter. Education was given and all questions were answered to patients satisfaction. Patient was encouraged to call with any changes in medications, bruising or bleeding problems or any other concerns and she verbalized understanding.  Discussed with Asha Calderon NP in the clinic and she agrees  with the plan of care.   26.3

## 2023-08-15 NOTE — H&P PST ADULT - HISTORY OF PRESENT ILLNESS
Patient is a  year old  female presenting today for PST, PMH includes   Patient is a  53 year old  female presenting today for PST, PMH includes UC, OA, GERD and migraines. Patient c/o urinary incontinence. Reports symptoms present for years but have worsened. States she has to void every 45 minutes and even still has leakage at times. She utilizes pads to prevent leaking onto clothing. She is very active and reports leaking with exercise, coughing, sneezing and laughing. Denies dysuria, hematuria, pelvic pain or recent UTI. Denies fever, chills, chest pain, shortness of breath or palpitations. She is now scheduled for midurethral sling, cystoscopy on 8/23/2023 with Dr. Crowe pending medical clearance.

## 2023-08-16 LAB
CULTURE RESULTS: SIGNIFICANT CHANGE UP
SPECIMEN SOURCE: SIGNIFICANT CHANGE UP

## 2023-08-21 LAB — CYTOLOGY CVX/VAG DOC THIN PREP: NORMAL

## 2023-08-22 ENCOUNTER — TRANSCRIPTION ENCOUNTER (OUTPATIENT)
Age: 53
End: 2023-08-22

## 2023-08-23 ENCOUNTER — APPOINTMENT (OUTPATIENT)
Dept: UROGYNECOLOGY | Facility: HOSPITAL | Age: 53
End: 2023-08-23

## 2023-08-23 ENCOUNTER — TRANSCRIPTION ENCOUNTER (OUTPATIENT)
Age: 53
End: 2023-08-23

## 2023-08-23 ENCOUNTER — OUTPATIENT (OUTPATIENT)
Dept: INPATIENT UNIT | Facility: HOSPITAL | Age: 53
LOS: 1 days | End: 2023-08-23
Payer: COMMERCIAL

## 2023-08-23 VITALS
OXYGEN SATURATION: 100 % | SYSTOLIC BLOOD PRESSURE: 110 MMHG | HEART RATE: 71 BPM | RESPIRATION RATE: 17 BRPM | DIASTOLIC BLOOD PRESSURE: 52 MMHG

## 2023-08-23 VITALS
DIASTOLIC BLOOD PRESSURE: 62 MMHG | WEIGHT: 162.7 LBS | HEART RATE: 73 BPM | TEMPERATURE: 98 F | RESPIRATION RATE: 20 BRPM | HEIGHT: 66 IN | SYSTOLIC BLOOD PRESSURE: 107 MMHG | OXYGEN SATURATION: 98 %

## 2023-08-23 DIAGNOSIS — N36.42 INTRINSIC SPHINCTER DEFICIENCY (ISD): ICD-10-CM

## 2023-08-23 DIAGNOSIS — Z98.890 OTHER SPECIFIED POSTPROCEDURAL STATES: Chronic | ICD-10-CM

## 2023-08-23 DIAGNOSIS — N39.3 STRESS INCONTINENCE (FEMALE) (MALE): ICD-10-CM

## 2023-08-23 DIAGNOSIS — Z90.49 ACQUIRED ABSENCE OF OTHER SPECIFIED PARTS OF DIGESTIVE TRACT: Chronic | ICD-10-CM

## 2023-08-23 PROCEDURE — C1771: CPT

## 2023-08-23 PROCEDURE — 57288 REPAIR BLADDER DEFECT: CPT

## 2023-08-23 DEVICE — SLING TRANSVAGINAL MID-URETHRAL ADVANTAGE FIT BLUE: Type: IMPLANTABLE DEVICE | Status: FUNCTIONAL

## 2023-08-23 RX ORDER — CELECOXIB 200 MG/1
400 CAPSULE ORAL ONCE
Refills: 0 | Status: COMPLETED | OUTPATIENT
Start: 2023-08-23 | End: 2023-08-23

## 2023-08-23 RX ORDER — SODIUM CHLORIDE 9 MG/ML
3 INJECTION INTRAMUSCULAR; INTRAVENOUS; SUBCUTANEOUS ONCE
Refills: 0 | Status: COMPLETED | OUTPATIENT
Start: 2023-08-23 | End: 2023-08-23

## 2023-08-23 RX ORDER — FENTANYL CITRATE 50 UG/ML
25 INJECTION INTRAVENOUS
Refills: 0 | Status: DISCONTINUED | OUTPATIENT
Start: 2023-08-23 | End: 2023-08-23

## 2023-08-23 RX ORDER — IBUPROFEN 200 MG
1 TABLET ORAL
Qty: 20 | Refills: 0
Start: 2023-08-23 | End: 2023-08-27

## 2023-08-23 RX ORDER — ONDANSETRON 8 MG/1
4 TABLET, FILM COATED ORAL ONCE
Refills: 0 | Status: DISCONTINUED | OUTPATIENT
Start: 2023-08-23 | End: 2023-08-23

## 2023-08-23 RX ORDER — SODIUM CHLORIDE 9 MG/ML
1000 INJECTION, SOLUTION INTRAVENOUS
Refills: 0 | Status: DISCONTINUED | OUTPATIENT
Start: 2023-08-23 | End: 2023-08-23

## 2023-08-23 RX ORDER — ACETAMINOPHEN 500 MG
975 TABLET ORAL ONCE
Refills: 0 | Status: COMPLETED | OUTPATIENT
Start: 2023-08-23 | End: 2023-08-23

## 2023-08-23 RX ORDER — ACETAMINOPHEN 500 MG
2 TABLET ORAL
Qty: 40 | Refills: 0
Start: 2023-08-23 | End: 2023-08-27

## 2023-08-23 RX ADMIN — Medication 975 MILLIGRAM(S): at 13:55

## 2023-08-23 RX ADMIN — SODIUM CHLORIDE 3 MILLILITER(S): 9 INJECTION INTRAMUSCULAR; INTRAVENOUS; SUBCUTANEOUS at 13:00

## 2023-08-23 RX ADMIN — CELECOXIB 400 MILLIGRAM(S): 200 CAPSULE ORAL at 13:55

## 2023-08-23 NOTE — BRIEF OPERATIVE NOTE - NSICDXBRIEFPROCEDURE_GEN_ALL_CORE_FT
PROCEDURES:  Placement of polypropylene mesh suburethral sling 23-Aug-2023 16:14:54  Janki Bal  Cystoscopy 23-Aug-2023 16:15:01  Janki Bal

## 2023-08-23 NOTE — ASU DISCHARGE PLAN (ADULT/PEDIATRIC) - CARE PROVIDER_API CALL
Amanda Crowe  Obstetrics and Gynecology  [address] [place] [address], [address]  [place] [address]  Phone: [phone]  Fax: [phone]  Follow Up Time: 2 weeks

## 2023-08-23 NOTE — BRIEF OPERATIVE NOTE - OPERATION/FINDINGS
Normal appearing female genitalia, Suburethral sling placed in usual fashion from Suprapubic approach and without difficulty using [employer] fit blue. Cystoscopy done with +Jets at [facility] bilaterally and normal appearing urothelium throughout. Excellent hemostasis noted

## 2023-08-23 NOTE — ASU DISCHARGE PLAN (ADULT/PEDIATRIC) - NS MD DC FALL RISK RISK
For information on Fall & Injury Prevention, visit: [email or web address] OR  [email or web address] OR  https://www.cdc.gov/steadi/patient.html

## 2023-08-23 NOTE — ASU PREOP CHECKLIST - BP NONINVASIVE SYSTOLIC (MM HG)
107 Dermal Autograft Text: The defect edges were debeveled with a #15 scalpel blade.  Given the location of the defect, shape of the defect and the proximity to free margins a dermal autograft was deemed most appropriate.  Using a sterile surgical marker, the primary defect shape was transferred to the donor site. The area thus outlined was incised deep to adipose tissue with a #15 scalpel blade.  The harvested graft was then trimmed of adipose and epidermal tissue until only dermis was left.  The skin graft was then placed in the primary defect and oriented appropriately.

## 2023-09-05 ENCOUNTER — APPOINTMENT (OUTPATIENT)
Dept: UROGYNECOLOGY | Facility: CLINIC | Age: 53
End: 2023-09-05
Payer: COMMERCIAL

## 2023-09-05 VITALS
DIASTOLIC BLOOD PRESSURE: 75 MMHG | SYSTOLIC BLOOD PRESSURE: 113 MMHG | RESPIRATION RATE: 21 BRPM | OXYGEN SATURATION: 100 % | HEART RATE: 86 BPM

## 2023-09-05 LAB
BILIRUB UR QL STRIP: NEGATIVE
CLARITY UR: CLEAR
COLLECTION METHOD: NORMAL
GLUCOSE UR-MCNC: NEGATIVE
HCG UR QL: 0.2 EU/DL
HGB UR QL STRIP.AUTO: NEGATIVE
KETONES UR-MCNC: NEGATIVE
LEUKOCYTE ESTERASE UR QL STRIP: NORMAL
NITRITE UR QL STRIP: NEGATIVE
PH UR STRIP: 5.5
PROT UR STRIP-MCNC: NEGATIVE
SP GR UR STRIP: 1.01

## 2023-09-05 PROCEDURE — 99024 POSTOP FOLLOW-UP VISIT: CPT

## 2023-09-05 PROCEDURE — 51798 US URINE CAPACITY MEASURE: CPT

## 2023-09-05 PROCEDURE — 81003 URINALYSIS AUTO W/O SCOPE: CPT | Mod: QW

## 2023-09-05 NOTE — ASSESSMENT
[FreeTextEntry1] : 52 y/o female post midurethral sling and cystoscopy  op report reviewed.  appointment confirmed.

## 2023-09-05 NOTE — DISCUSSION/SUMMARY
[Post-Op instructions given. Pt/family verbalizes understanding] : post-operative instructions were provided to the patient/family who verbalize understanding [FreeTextEntry1] : no heavy lifting pushing or pulling nothing in vagina  keep scheduled appointment

## 2023-09-05 NOTE — OBJECTIVE
[Post Void Residual ____ ml] : Post Void Residual was [unfilled] ml [FreeTextEntry3] : no active bleeding incision site intact + sutures noted , no erosion, no exposure

## 2023-09-05 NOTE — SUBJECTIVE
[FreeTextEntry1] : doing well  post sling and cystoscopy  [FreeTextEntry8] : no changes  [FreeTextEntry7] : discomfort  [FreeTextEntry6] : ok  [FreeTextEntry5] : no leakage with laugh cough or sneeze, no urge incontinence , no dysuria, good urine flow, no foul smell.  [FreeTextEntry4] : +bm  [FreeTextEntry3] : good [FreeTextEntry2] : unchanged

## 2023-09-07 ENCOUNTER — OFFICE (OUTPATIENT)
Dept: URBAN - METROPOLITAN AREA CLINIC 105 | Facility: CLINIC | Age: 53
Setting detail: OPHTHALMOLOGY
End: 2023-09-07
Payer: COMMERCIAL

## 2023-09-07 DIAGNOSIS — H25.13: ICD-10-CM

## 2023-09-07 DIAGNOSIS — T15.11XA: ICD-10-CM

## 2023-09-07 PROCEDURE — 99213 OFFICE O/P EST LOW 20 MIN: CPT | Performed by: REGISTERED NURSE

## 2023-09-07 ASSESSMENT — TONOMETRY: OS_IOP_MMHG: 12

## 2023-09-07 ASSESSMENT — REFRACTION_MANIFEST
OS_AXIS: 95
OD_VA1: 20/20
OS_CYLINDER: -0.50
OD_CYLINDER: -0.50
OS_CYLINDER: SPHERE
OD_ADD: +1.75
OD_AXIS: 95
OS_SPHERE: PLANO
OS_VA1: 20/20
OD_CYLINDER: SPHERE
OD_SPHERE: +0.50
OS_SPHERE: PLANO
OS_VA1: 20/20
OD_SPHERE: PLANO
OS_ADD: +2.00
OD_VA1: 20/20
OD_ADD: +2.00
OS_ADD: +1.75

## 2023-09-07 ASSESSMENT — REFRACTION_CURRENTRX
OS_CYLINDER: -0.50
OD_SPHERE: +0.75
OD_ADD: +1.75
OD_CYLINDER: -0.50
OD_AXIS: 096
OS_AXIS: 090
OS_ADD: +1.75
OS_VPRISM_DIRECTION: PROGS
OD_VPRISM_DIRECTION: PROGS
OD_OVR_VA: 20/
OS_SPHERE: +0.25
OS_OVR_VA: 20/

## 2023-09-07 ASSESSMENT — AXIALLENGTH_DERIVED
OS_AL: 23.5835
OD_AL: 23.4949
OD_AL: 23.3033

## 2023-09-07 ASSESSMENT — REFRACTION_AUTOREFRACTION
OD_SPHERE: +1.00
OD_CYLINDER: -0.50
OS_SPHERE: +0.50
OD_AXIS: 088
OS_AXIS: 093
OS_CYLINDER: -0.25

## 2023-09-07 ASSESSMENT — SUPERFICIAL PUNCTATE KERATITIS (SPK): OS_SPK: 2+

## 2023-09-07 ASSESSMENT — KERATOMETRY
OD_AXISANGLE_DEGREES: 090
OD_K2POWER_DIOPTERS: 43.50
OS_K1POWER_DIOPTERS: 42.75
OD_K1POWER_DIOPTERS: 43.50
OS_K2POWER_DIOPTERS: 43.50
OS_AXISANGLE_DEGREES: 171

## 2023-09-07 ASSESSMENT — VISUAL ACUITY
OS_BCVA: 20/30-2
OD_BCVA: 20/20

## 2023-09-07 ASSESSMENT — SPHEQUIV_DERIVED
OD_SPHEQUIV: 0.75
OS_SPHEQUIV: 0.375
OD_SPHEQUIV: 0.25

## 2023-09-07 ASSESSMENT — CONFRONTATIONAL VISUAL FIELD TEST (CVF)
OD_FINDINGS: FULL
OS_FINDINGS: FULL

## 2023-10-09 ENCOUNTER — APPOINTMENT (OUTPATIENT)
Dept: UROGYNECOLOGY | Facility: CLINIC | Age: 53
End: 2023-10-09
Payer: COMMERCIAL

## 2023-10-09 VITALS
SYSTOLIC BLOOD PRESSURE: 113 MMHG | HEIGHT: 66 IN | DIASTOLIC BLOOD PRESSURE: 71 MMHG | BODY MASS INDEX: 25.71 KG/M2 | WEIGHT: 160 LBS | HEART RATE: 76 BPM

## 2023-10-09 DIAGNOSIS — Z98.890 OTHER SPECIFIED POSTPROCEDURAL STATES: ICD-10-CM

## 2023-10-09 PROBLEM — K21.9 GASTRO-ESOPHAGEAL REFLUX DISEASE WITHOUT ESOPHAGITIS: Chronic | Status: ACTIVE | Noted: 2023-08-15

## 2023-10-09 PROBLEM — M19.90 UNSPECIFIED OSTEOARTHRITIS, UNSPECIFIED SITE: Chronic | Status: ACTIVE | Noted: 2023-08-15

## 2023-10-09 PROBLEM — G43.909 MIGRAINE, UNSPECIFIED, NOT INTRACTABLE, WITHOUT STATUS MIGRAINOSUS: Chronic | Status: ACTIVE | Noted: 2023-08-15

## 2023-10-09 PROBLEM — Z87.19 PERSONAL HISTORY OF OTHER DISEASES OF THE DIGESTIVE SYSTEM: Chronic | Status: ACTIVE | Noted: 2023-08-15

## 2023-10-09 PROCEDURE — 99024 POSTOP FOLLOW-UP VISIT: CPT

## 2023-10-19 ASSESSMENT — REFRACTION_MANIFEST
OD_ADD: +1.75
OS_VA1: 20/20
OS_ADD: +1.75
OS_ADD: +2.00
OD_ADD: +2.00
OS_SPHERE: PLANO
OD_VA1: 20/20
OS_AXIS: 95
OS_CYLINDER: SPHERE
OS_VA1: 20/20
OD_SPHERE: PLANO
OD_CYLINDER: -0.50
OS_SPHERE: PLANO
OD_CYLINDER: SPHERE
OD_AXIS: 95
OD_SPHERE: +0.50
OD_VA1: 20/20
OS_CYLINDER: -0.50

## 2023-10-19 ASSESSMENT — AXIALLENGTH_DERIVED
OD_AL: 23.5863
OD_AL: 23.3932
OS_AL: 23.5891

## 2023-10-19 ASSESSMENT — KERATOMETRY
OS_K1POWER_DIOPTERS: 42.75
OS_K2POWER_DIOPTERS: 44.00
OD_AXISANGLE_DEGREES: 175
OS_AXISANGLE_DEGREES: 150
OD_K2POWER_DIOPTERS: 43.50
OD_K1POWER_DIOPTERS: 43.00

## 2023-10-19 ASSESSMENT — SPHEQUIV_DERIVED
OD_SPHEQUIV: 0.75
OS_SPHEQUIV: 0.125
OD_SPHEQUIV: 0.25

## 2023-10-19 ASSESSMENT — REFRACTION_CURRENTRX
OS_OVR_VA: 20/
OD_ADD: +1.75
OD_CYLINDER: -0.50
OS_VPRISM_DIRECTION: PROGS
OD_AXIS: 093
OS_CYLINDER: -0.50
OD_SPHERE: +0.50
OD_OVR_VA: 20/
OD_VPRISM_DIRECTION: PROGS
OS_AXIS: 089
OS_ADD: +1.75
OS_SPHERE: +0.25

## 2023-10-19 ASSESSMENT — REFRACTION_AUTOREFRACTION
OD_AXIS: 090
OS_SPHERE: +0.25
OD_CYLINDER: -0.50
OD_SPHERE: +1.00
OS_CYLINDER: -0.25
OS_AXIS: 086

## 2023-10-20 ENCOUNTER — NON-APPOINTMENT (OUTPATIENT)
Age: 53
End: 2023-10-20

## 2024-01-23 ENCOUNTER — APPOINTMENT (OUTPATIENT)
Dept: UROGYNECOLOGY | Facility: CLINIC | Age: 54
End: 2024-01-23
Payer: COMMERCIAL

## 2024-01-23 VITALS
WEIGHT: 160 LBS | DIASTOLIC BLOOD PRESSURE: 67 MMHG | HEIGHT: 66 IN | SYSTOLIC BLOOD PRESSURE: 104 MMHG | BODY MASS INDEX: 25.71 KG/M2

## 2024-01-23 LAB
BILIRUB UR QL STRIP: NEGATIVE
CLARITY UR: CLEAR
COLLECTION METHOD: NORMAL
GLUCOSE UR-MCNC: NEGATIVE
HCG UR QL: 0.2 EU/DL
HGB UR QL STRIP.AUTO: NEGATIVE
KETONES UR-MCNC: NEGATIVE
LEUKOCYTE ESTERASE UR QL STRIP: NORMAL
NITRITE UR QL STRIP: NEGATIVE
PH UR STRIP: 5.5
PROT UR STRIP-MCNC: NEGATIVE
SP GR UR STRIP: 1.02

## 2024-01-23 PROCEDURE — 81003 URINALYSIS AUTO W/O SCOPE: CPT | Mod: QW

## 2024-01-23 PROCEDURE — 51798 US URINE CAPACITY MEASURE: CPT

## 2024-01-23 PROCEDURE — 99212 OFFICE O/P EST SF 10 MIN: CPT

## 2024-01-23 NOTE — PHYSICAL EXAM
[Chaperone Present] : A chaperone was present in the examining room during all aspects of the physical examination [No Acute Distress] : in no acute distress [Well developed] : well developed [Well Nourished] : ~L well nourished [Oriented x3] : oriented to person, place, and time [No Edema] : ~T edema was not present [Warm and Dry] : was warm and dry to touch [Normal Appearance] : general appearance was normal [Cough] : no cough [Distended] : not distended [Tenderness] : ~T no ~M abdominal tenderness observed [FreeTextEntry4] : Good support, no mesh exposure, no erosion, no mass or tenderness

## 2024-01-23 NOTE — DISCUSSION/SUMMARY
[FreeTextEntry1] : SHEMAR is a 53 year female who presents s/p sling cysto on 08/23/2023. Overall happy. No current concerns.  f/u as needed

## 2024-01-23 NOTE — ADDENDUM
[FreeTextEntry1] : This note was written by Rosalia Burnham, acting as the  for Dr. Crowe. This note accurately reflects the work and decisions made by Dr. Crowe.

## 2024-01-23 NOTE — HISTORY OF PRESENT ILLNESS
[Unable To Restrain Bowel Movement] : no [Urinary Frequency] : no [Urinary Tract Infection] : no [FreeTextEntry1] : SHEMAR is a 53 year female who presents s/p sling cysto on 08/23/2023. Last seen for scheduled post op visit 10/09/2023. No leakage, dysuria, incomplete emptying. No urgency frequency. Reports slower stream. Happy with results.

## 2024-03-07 ENCOUNTER — APPOINTMENT (OUTPATIENT)
Dept: ORTHOPEDIC SURGERY | Facility: CLINIC | Age: 54
End: 2024-03-07
Payer: COMMERCIAL

## 2024-03-07 PROCEDURE — L3908: CPT | Mod: RT

## 2024-03-07 PROCEDURE — 99203 OFFICE O/P NEW LOW 30 MIN: CPT

## 2024-03-07 RX ORDER — OMEPRAZOLE 40 MG/1
40 CAPSULE, DELAYED RELEASE ORAL
Refills: 0 | Status: ACTIVE | COMMUNITY

## 2024-03-14 ENCOUNTER — APPOINTMENT (OUTPATIENT)
Dept: ORTHOPEDIC SURGERY | Facility: CLINIC | Age: 54
End: 2024-03-14
Payer: COMMERCIAL

## 2024-03-14 VITALS — WEIGHT: 160 LBS | BODY MASS INDEX: 25.71 KG/M2 | HEIGHT: 66 IN

## 2024-03-14 DIAGNOSIS — M76.821 POSTERIOR TIBIAL TENDINITIS, RIGHT LEG: ICD-10-CM

## 2024-03-14 DIAGNOSIS — M77.52 OTHER ENTHESOPATHY OF LT FOOT AND ANKLE: ICD-10-CM

## 2024-03-14 DIAGNOSIS — Z00.00 ENCOUNTER FOR GENERAL ADULT MEDICAL EXAMINATION W/OUT ABNORMAL FINDINGS: ICD-10-CM

## 2024-03-14 PROCEDURE — 73630 X-RAY EXAM OF FOOT: CPT | Mod: 50

## 2024-03-14 PROCEDURE — 99214 OFFICE O/P EST MOD 30 MIN: CPT

## 2024-03-14 PROCEDURE — 99204 OFFICE O/P NEW MOD 45 MIN: CPT | Mod: 25

## 2024-03-14 PROCEDURE — 73600 X-RAY EXAM OF ANKLE: CPT | Mod: 50

## 2024-03-14 RX ORDER — MELOXICAM 15 MG/1
15 TABLET ORAL DAILY
Qty: 30 | Refills: 2 | Status: COMPLETED | COMMUNITY
Start: 2024-03-14 | End: 2024-06-12

## 2024-03-14 NOTE — PHYSICAL EXAM
[NL 30)] : inversion 30 degrees [NL (40)] : plantar flexion 40 degrees [NL (20)] : eversion 20 degrees [5___] : eversion 5[unfilled]/5 [2+] : posterior tibialis pulse: 2+ [Decreased] : medial plantar nerve sensation decreased [Normal] : saphenous nerve sensation normal [] : patient ambulates without assistive device [Left] : left foot [Right] : right foot [Weight -] : weightbearing [There are no fractures, subluxations or dislocations. No significant abnormalities are seen] : There are no fractures, subluxations or dislocations. No significant abnormalities are seen [FreeTextEntry9] : Pain at extremes of motion.  [FreeTextEntry3] : Slight forefoot abduction. [de-identified] : Decreased sensation hallux.  [de-identified] : Mild pesplanovalgus with forefoot abduction. Mild sagging at NC joint.

## 2024-03-14 NOTE — HISTORY OF PRESENT ILLNESS
[10] : 10 [3] : 3 [Burning] : burning [Shooting] : shooting [Constant] : constant [Household chores] : household chores [Leisure] : leisure [Work] : work [Physical therapy] : physical therapy [Social interactions] : social interactions [Walking] : walking [Standing] : standing [Exercising] : exercising [Stairs] : stairs [Full time] : Work status: full time [de-identified] : Pt. is a 53 year old female who presents for evaluation of her feet/ankles. Denies trauma. Symptoms on the RT since October 2023. Symptoms on the LT chronic in nature, intermittent more mild than the RT foot/ankle. She wore a boot and transitioned to brace which unable to wear due to swelling. WB in sneakers, using sleeve prn. She has been doing PT 1x a week. Medrol dose pack with temporary relief.  She reports some numbness in both feet. She has seen a neurologist in the past and was told that EMG/NCV showed no pathology. [] : no [FreeTextEntry1] : rt foot worse [FreeTextEntry5] : pt woke up with pain Nov and was in a boot NOV to Jan. [FreeTextEntry9] : massage in PT  [de-identified] : other drs and podiatrist and neurolgist  [de-identified] : rt foot [de-identified] : teacher

## 2024-03-14 NOTE — DISCUSSION/SUMMARY
[de-identified] : MRI/CT report and films reviewed with patient. There is no sign of stress fracture in the right talus.  Custom orthotics are recommended. MRI left foot ordered as that is where most of her pain is on that side. PT for both ankles is recommended.  Meloxicam prescribed. The patient was explained the options as well as benefits of over the counter verses prescription strength nonsteroidal anti-inflammatory medication. The patient opts for a prescription strength medication.

## 2024-03-14 NOTE — DATA REVIEWED
[MRI] : MRI [CT Scan] : CT scan [Right] : of the right [Report was reviewed and noted in the chart] : The report was reviewed and noted in the chart [Ankle] : ankle [FreeTextEntry1] : ZP 1/13/24: Minimal peroneus and tibialis posterior tenosynovitis, without tendinosis or tendon tear.  Chronic lateral collateral ligament sprain with chronic partial tearing of the talofibular ligaments.  Osteoarthritis as detailed above [I reviewed the films/CD] : I reviewed the films/CD [FreeTextEntry2] : ZP 3/2/24: Incomplete stress fracture along the posterior margin of the talus with a lucent fracture line extending to the articular surface at the posterior subtalar joint and moderate surrounding sclerosis. This is age-indeterminate an MRI of the ankle is recommended for further evaluation.  There is mild posterior subtalar arthrosis.  There is mild midfoot arthrosis as well as osteophytic changes at the first MP joint. There is mild bunion formation.  There is moderate inferior calcaneal spur formation.

## 2024-03-19 NOTE — HISTORY OF PRESENT ILLNESS
[Sudden] : sudden [10] : 10 [5] : 5 [Dull/Aching] : dull/aching [Shooting] : shooting [Full time] : Work status: full time [de-identified] : 53 year old female presenting with RIGHT posterior and lateral RT elbow pain for the past 3 months. No injury/trauma.  [] : no [FreeTextEntry1] : Right elbow  [FreeTextEntry3] : 12/2024 [FreeTextEntry9] : Tramadol cream  [de-identified] : Range of motion / Twisting  [de-identified] : Teacher

## 2024-03-19 NOTE — PHYSICAL EXAM
[Right] : right elbow [] : lateral elbow pain with resisted wrist extension [de-identified] : PIN with minimal tenderness

## 2024-03-19 NOTE — DISCUSSION/SUMMARY
[de-identified] : Discussed the nature of the diagnosis and risk and benefits of different modalities of treatment. Discussed the likelihood of symptoms resolving with time.  Recommended a modified lifting technique. Start PT and use of tennis elbow strap. All questions answered.  RTO 4 weeks.

## 2024-04-04 ENCOUNTER — APPOINTMENT (OUTPATIENT)
Dept: ORTHOPEDIC SURGERY | Facility: CLINIC | Age: 54
End: 2024-04-04
Payer: COMMERCIAL

## 2024-04-04 VITALS — BODY MASS INDEX: 25.71 KG/M2 | HEIGHT: 66 IN | WEIGHT: 160 LBS

## 2024-04-04 DIAGNOSIS — M77.11 LATERAL EPICONDYLITIS, RIGHT ELBOW: ICD-10-CM

## 2024-04-04 PROCEDURE — 99213 OFFICE O/P EST LOW 20 MIN: CPT

## 2024-04-11 ENCOUNTER — APPOINTMENT (OUTPATIENT)
Dept: ORTHOPEDIC SURGERY | Facility: CLINIC | Age: 54
End: 2024-04-11
Payer: COMMERCIAL

## 2024-04-11 VITALS — HEIGHT: 66 IN | WEIGHT: 160 LBS | BODY MASS INDEX: 25.71 KG/M2

## 2024-04-11 DIAGNOSIS — M79.672 PAIN IN LEFT FOOT: ICD-10-CM

## 2024-04-11 DIAGNOSIS — M77.52 OTHER ENTHESOPATHY OF LT FOOT AND ANKLE: ICD-10-CM

## 2024-04-11 DIAGNOSIS — Q66.51 CONGENITAL PES PLANUS, RIGHT FOOT: ICD-10-CM

## 2024-04-11 DIAGNOSIS — M77.51 OTHER ENTHESOPATHY OF RT FOOT AND ANKLE: ICD-10-CM

## 2024-04-11 PROCEDURE — 99213 OFFICE O/P EST LOW 20 MIN: CPT

## 2024-04-11 NOTE — HISTORY OF PRESENT ILLNESS
[10] : 10 [4] : 4 [Burning] : burning [Shooting] : shooting [Throbbing] : throbbing [Constant] : constant [Household chores] : household chores [Leisure] : leisure [Work] : work [Sleep] : sleep [Social interactions] : social interactions [Meds] : meds [Physical therapy] : physical therapy [Standing] : standing [Walking] : walking [Exercising] : exercising [Stairs] : stairs [Lying in bed] : lying in bed [Full time] : Work status: full time [de-identified] : Pt. presents for follow up of her feet/ankles. Denies trauma. Symptoms on the RT since October 2023. Symptoms on the LT chronic in nature, intermittent more mild than the RT foot/ankle. She wore a boot and transitioned to brace which unable to wear due to swelling. WB in sneakers, using sleeve prn. She has tried PT 1x a week. Medrol dose pack with temporary relief.  She reports some numbness in both feet. She has seen a neurologist in the past and was told that EMG/NCV showed no pathology. Mobic is very helpful. She is able to be more activity.  [] : no [FreeTextEntry1] : rt foot worse [FreeTextEntry5] : pt woke up with pain Nov and was in a boot NOV to Jan. [FreeTextEntry6] : soreness [de-identified] : has to stop anti inflammatories due to having migraine headaches [de-identified] : teacher

## 2024-04-11 NOTE — PHYSICAL EXAM
[NL (40)] : plantar flexion 40 degrees [NL 30)] : inversion 30 degrees [NL (20)] : eversion 20 degrees [5___] : eversion 5[unfilled]/5 [2+] : posterior tibialis pulse: 2+ [Decreased] : medial plantar nerve sensation decreased [Normal] : saphenous nerve sensation normal [Left] : left foot [Right] : right foot [Weight -] : weightbearing [There are no fractures, subluxations or dislocations. No significant abnormalities are seen] : There are no fractures, subluxations or dislocations. No significant abnormalities are seen [de-identified] : Mild pesplanovalgus. Mild sagging at NC joint.  Evidence of prior distal 1st metatarsal osteotomy/bunionectomy. [] : non-antalgic [FreeTextEntry3] : Slight forefoot abduction. [FreeTextEntry9] : Pain at extremes of motion.  [de-identified] : Mild foot pain with resisted dorsiflexion.  [de-identified] : Decreased sensation hallux.

## 2024-04-11 NOTE — DISCUSSION/SUMMARY
[de-identified] : Custom orthotics are again recommended. PT for both ankles is recommended. Mobic 15 mg qday prn.

## 2024-05-02 ENCOUNTER — APPOINTMENT (OUTPATIENT)
Dept: ORTHOPEDIC SURGERY | Facility: CLINIC | Age: 54
End: 2024-05-02

## 2024-05-02 NOTE — PHYSICAL EXAM
[Right] : right elbow [] : lateral elbow pain with resisted wrist extension [de-identified] : PIN with minimal tenderness

## 2024-05-02 NOTE — DISCUSSION/SUMMARY
[de-identified] : Discussed the nature of the diagnosis and risk and benefits of different modalities of treatment. Discussed continued conservative management vs CSI.  Continue with conservative management.  Modified lifting technique, tennis elbow strap and HEP. RTO 4 weeks.

## 2024-05-02 NOTE — HISTORY OF PRESENT ILLNESS
[10] : 10 [0] : 0 [Sharp] : sharp [Full time] : Work status: full time [de-identified] : 53 year old female followed for a Lateral epicondylitis of right elbow. She has been managing conservatively. She has not yet attended therapy due to insurance as she is already attending PT for other body parts.   [] : no [FreeTextEntry1] : right elbow [FreeTextEntry9] : Meloxicam (rxd by foot doc)  [de-identified] : certain movements, reaching, lifting  [de-identified] : brace, unable to start PT as she is already attending PT for another body part  [de-identified] : teacher

## 2024-05-19 ENCOUNTER — OFFICE (OUTPATIENT)
Dept: URBAN - METROPOLITAN AREA CLINIC 1 | Facility: CLINIC | Age: 54
Setting detail: OPHTHALMOLOGY
End: 2024-05-19
Payer: COMMERCIAL

## 2024-05-19 DIAGNOSIS — H25.13: ICD-10-CM

## 2024-05-19 DIAGNOSIS — H16.223: ICD-10-CM

## 2024-05-19 DIAGNOSIS — H52.4: ICD-10-CM

## 2024-05-19 PROBLEM — H52.7 REFRACTIVE ERROR: Status: ACTIVE | Noted: 2024-05-19

## 2024-05-19 PROBLEM — H52.13 S/P LASIK; BOTH EYES: Status: ACTIVE | Noted: 2024-05-19

## 2024-05-19 PROCEDURE — 92014 COMPRE OPH EXAM EST PT 1/>: CPT

## 2024-05-19 PROCEDURE — 92015 DETERMINE REFRACTIVE STATE: CPT

## 2024-05-19 ASSESSMENT — CONFRONTATIONAL VISUAL FIELD TEST (CVF)
OD_FINDINGS: FULL
OS_FINDINGS: FULL

## 2024-05-23 ENCOUNTER — APPOINTMENT (OUTPATIENT)
Dept: ORTHOPEDIC SURGERY | Facility: CLINIC | Age: 54
End: 2024-05-23

## 2024-08-03 ENCOUNTER — OFFICE (OUTPATIENT)
Dept: URBAN - METROPOLITAN AREA CLINIC 12 | Facility: CLINIC | Age: 54
Setting detail: OPHTHALMOLOGY
End: 2024-08-03
Payer: COMMERCIAL

## 2024-08-03 DIAGNOSIS — H15.102: ICD-10-CM

## 2024-08-03 PROCEDURE — 92012 INTRM OPH EXAM EST PATIENT: CPT | Performed by: OPTOMETRIST

## 2024-08-03 ASSESSMENT — CONFRONTATIONAL VISUAL FIELD TEST (CVF)
OS_FINDINGS: FULL
OD_FINDINGS: FULL

## 2024-08-10 ENCOUNTER — NON-APPOINTMENT (OUTPATIENT)
Age: 54
End: 2024-08-10

## 2024-08-10 ENCOUNTER — APPOINTMENT (OUTPATIENT)
Dept: OBGYN | Facility: CLINIC | Age: 54
End: 2024-08-10

## 2024-08-10 PROBLEM — R92.30 DENSE BREAST TISSUE ON MAMMOGRAM: Status: ACTIVE | Noted: 2023-08-03

## 2024-08-10 PROBLEM — Z13.820 OSTEOPOROSIS SCREENING: Status: RESOLVED | Noted: 2023-08-03 | Resolved: 2024-08-10

## 2024-08-10 PROBLEM — Z98.890 POST-OPERATIVE STATE: Status: RESOLVED | Noted: 2023-09-05 | Resolved: 2024-08-10

## 2024-08-10 PROBLEM — Z01.419 ENCOUNTER FOR WELL WOMAN EXAM WITH ROUTINE GYNECOLOGICAL EXAM: Status: ACTIVE | Noted: 2024-08-10

## 2024-08-10 PROBLEM — Z01.419 ENCOUNTER FOR WELL WOMAN EXAM WITH ROUTINE GYNECOLOGICAL EXAM: Status: RESOLVED | Noted: 2022-07-21 | Resolved: 2024-08-10

## 2024-08-10 PROBLEM — Z12.31 ENCOUNTER FOR SCREENING MAMMOGRAM FOR BREAST CANCER: Status: ACTIVE | Noted: 2024-08-10 | Resolved: 2024-08-24

## 2024-08-10 PROCEDURE — 99396 PREV VISIT EST AGE 40-64: CPT

## 2024-08-10 PROCEDURE — 99459 PELVIC EXAMINATION: CPT

## 2024-08-10 NOTE — HISTORY OF PRESENT ILLNESS
[postmenopausal] : postmenopausal [N] : Patient is not sexually active [Y] : Positive pregnancy history [Menarche Age: ____] : age at menarche was [unfilled] [Previously active] : previously active [Mammogramdate] : 10/20/23 [TextBox_19] : BR2 [BreastSonogramDate] : 10/20/23 [TextBox_25] : BR2 [PapSmeardate] : 08/03/23 [TextBox_31] : NEG [BoneDensityDate] : 08/23/19 [TextBox_37] : OSTEOPENIA [LMPDate] : 06/22/23 [PGHxTotal] : 2 [[facility]Full[facility]] : 2 [BannerxLiving] : 2 [FreeTextEntry1] : 06/22/23

## 2024-08-10 NOTE — PHYSICAL EXAM
[Chaperone Present] : A chaperone was present in the examining room during all aspects of the physical examination [[id number]] : A chaperone was present during the pelvic exam. [Appropriately responsive] : appropriately responsive [Alert] : alert [No Acute Distress] : no acute distress [Soft] : soft [Non-distended] : non-distended [Non-tender] : non-tender [Oriented x3] : oriented x3 [Examination Of The Breasts] : a normal appearance [No Masses] : no breast masses were palpable [Labia Majora] : normal [Labia Minora] : normal [Dry Mucosa] : dry mucosa [Atrophy] : atrophy [Normal] : normal [Uterine Adnexae] : non-palpable

## 2024-11-07 NOTE — ASU PREOP CHECKLIST - SITE MARKED BY ANESTHESIOLOGIST
Vss. Torsemide bid started.   Problem: Adult Inpatient Plan of Care  Goal: Plan of Care Review  Outcome: Progressing  Goal: Patient-Specific Goal (Individualized)  Outcome: Progressing  Goal: Absence of Hospital-Acquired Illness or Injury  Outcome: Progressing  Intervention: Identify and Manage Fall Risk  Recent Flowsheet Documentation  Taken 11/7/2024 1830 by Maribeth Parisi RN  Safety Promotion/Fall Prevention:   safety round/check completed   nonskid shoes/slippers when out of bed   fall prevention program maintained  Taken 11/7/2024 1651 by Maribeth Parisi RN  Safety Promotion/Fall Prevention:   safety round/check completed   nonskid shoes/slippers when out of bed   fall prevention program maintained  Taken 11/7/2024 1458 by Maribeth Parisi RN  Safety Promotion/Fall Prevention:   fall prevention program maintained   safety round/check completed   nonskid shoes/slippers when out of bed  Taken 11/7/2024 1259 by Maribeth Parisi RN  Safety Promotion/Fall Prevention:   safety round/check completed   nonskid shoes/slippers when out of bed   fall prevention program maintained  Taken 11/7/2024 1020 by Maribeth Parisi RN  Safety Promotion/Fall Prevention:   safety round/check completed   nonskid shoes/slippers when out of bed   fall prevention program maintained  Taken 11/7/2024 0845 by Maribeth Parisi RN  Safety Promotion/Fall Prevention:   fall prevention program maintained   safety round/check completed   nonskid shoes/slippers when out of bed  Intervention: Prevent Skin Injury  Recent Flowsheet Documentation  Taken 11/7/2024 1458 by Maribeth Parisi RN  Body Position: position changed independently  Taken 11/7/2024 0845 by Maribeth Parisi RN  Body Position: position changed independently  Intervention: Prevent and Manage VTE (Venous Thromboembolism) Risk  Recent Flowsheet Documentation  Taken 11/7/2024 0845 by Maribeth Parisi RN  VTE Prevention/Management:   patient refused  intervention   SCDs (sequential compression devices) off  Goal: Optimal Comfort and Wellbeing  Outcome: Progressing  Intervention: Provide Person-Centered Care  Recent Flowsheet Documentation  Taken 11/7/2024 1458 by Maribeth Parisi, RN  Trust Relationship/Rapport: care explained  Taken 11/7/2024 0845 by Maribeth Parisi, RN  Trust Relationship/Rapport: care explained  Goal: Readiness for Transition of Care  Outcome: Progressing   Goal Outcome Evaluation:                                             n/a

## 2025-08-19 ENCOUNTER — APPOINTMENT (OUTPATIENT)
Dept: OBGYN | Facility: CLINIC | Age: 55
End: 2025-08-19
Payer: COMMERCIAL

## 2025-08-19 VITALS
BODY MASS INDEX: 27.16 KG/M2 | HEIGHT: 66 IN | WEIGHT: 169 LBS | DIASTOLIC BLOOD PRESSURE: 78 MMHG | SYSTOLIC BLOOD PRESSURE: 124 MMHG

## 2025-08-19 DIAGNOSIS — M25.50 PAIN IN UNSPECIFIED JOINT: ICD-10-CM

## 2025-08-19 DIAGNOSIS — G47.9 SLEEP DISORDER, UNSPECIFIED: ICD-10-CM

## 2025-08-19 DIAGNOSIS — Z01.419 ENCOUNTER FOR GYNECOLOGICAL EXAMINATION (GENERAL) (ROUTINE) W/OUT ABNORMAL FINDINGS: ICD-10-CM

## 2025-08-19 DIAGNOSIS — Z13.820 ENCOUNTER FOR SCREENING FOR OSTEOPOROSIS: ICD-10-CM

## 2025-08-19 DIAGNOSIS — R92.343 MAMMOGRAPHIC EXTREME DENSITY, BILATERAL BREASTS: ICD-10-CM

## 2025-08-19 DIAGNOSIS — Z01.411 ENCOUNTER FOR GYNECOLOGICAL EXAMINATION (GENERAL) (ROUTINE) WITH ABNORMAL FINDINGS: ICD-10-CM

## 2025-08-19 DIAGNOSIS — Z12.31 ENCOUNTER FOR SCREENING MAMMOGRAM FOR MALIGNANT NEOPLASM OF BREAST: ICD-10-CM

## 2025-08-19 DIAGNOSIS — R41.89 OTHER SYMPTOMS AND SIGNS INVOLVING COGNITIVE FUNCTIONS AND AWARENESS: ICD-10-CM

## 2025-08-19 DIAGNOSIS — N95.1 MENOPAUSAL AND FEMALE CLIMACTERIC STATES: ICD-10-CM

## 2025-08-19 DIAGNOSIS — R42 DIZZINESS AND GIDDINESS: ICD-10-CM

## 2025-08-19 DIAGNOSIS — G43.909 MIGRAINE, UNSPECIFIED, NOT INTRACTABLE, W/OUT STATUS MIGRAINOSUS: ICD-10-CM

## 2025-08-19 DIAGNOSIS — R23.2 FLUSHING: ICD-10-CM

## 2025-08-19 PROCEDURE — 99215 OFFICE O/P EST HI 40 MIN: CPT | Mod: 25

## 2025-08-19 PROCEDURE — 99459 PELVIC EXAMINATION: CPT | Mod: NC

## 2025-08-19 PROCEDURE — 99396 PREV VISIT EST AGE 40-64: CPT

## 2025-08-19 RX ORDER — PROGESTERONE 100 MG/1
100 CAPSULE ORAL
Qty: 3 | Refills: 1 | Status: ACTIVE | COMMUNITY
Start: 2025-08-19 | End: 1900-01-01

## 2025-08-19 RX ORDER — ESTRADIOL 0.5 MG/.5G
0.5 GEL TOPICAL
Qty: 3 | Refills: 1 | Status: ACTIVE | COMMUNITY
Start: 2025-08-19 | End: 1900-01-01

## 2025-08-29 ENCOUNTER — OFFICE (OUTPATIENT)
Dept: URBAN - METROPOLITAN AREA CLINIC 1 | Facility: CLINIC | Age: 55
Setting detail: OPHTHALMOLOGY
End: 2025-08-29
Payer: COMMERCIAL

## 2025-08-29 DIAGNOSIS — H25.13: ICD-10-CM

## 2025-08-29 DIAGNOSIS — H43.391: ICD-10-CM

## 2025-08-29 DIAGNOSIS — H16.223: ICD-10-CM

## 2025-08-29 PROCEDURE — 92014 COMPRE OPH EXAM EST PT 1/>: CPT

## 2025-08-29 ASSESSMENT — REFRACTION_CURRENTRX
OD_AXIS: 094
OD_VPRISM_DIRECTION: PROGS
OD_ADD: +2.00
OD_OVR_VA: 20/
OD_SPHERE: +1.00
OS_OVR_VA: 20/
OS_SPHERE: +0.50
OS_CYLINDER: -0.50
OS_VPRISM_DIRECTION: PROGS
OS_ADD: +2.00
OD_CYLINDER: -0.75
OS_AXIS: 087

## 2025-08-29 ASSESSMENT — KERATOMETRY
OD_K2POWER_DIOPTERS: 43.50
OS_AXISANGLE_DEGREES: 178
OS_K2POWER_DIOPTERS: 43.50
OS_K1POWER_DIOPTERS: 4.50
OD_K1POWER_DIOPTERS: 42.50
OD_AXISANGLE_DEGREES: 002

## 2025-08-29 ASSESSMENT — REFRACTION_MANIFEST
OD_SPHERE: +1.00
OS_CYLINDER: -0.50
OS_AXIS: 90
OD_SPHERE: +0.50
OD_ADD: +2.00
OS_SPHERE: +0.50
OD_VA1: 20/20
OS_VA1: 20/20
OS_ADD: +2.00
OD_CYLINDER: -0.75
OS_AXIS: 95
OS_SPHERE: PLANO
OS_ADD: +2.00
OS_VA1: 20/20
OD_CYLINDER: -0.50
OS_CYLINDER: -0.50
OD_VA1: 20/20
OD_AXIS: 95
OD_AXIS: 90
OD_ADD: +2.00

## 2025-08-29 ASSESSMENT — VISUAL ACUITY
OS_BCVA: 20/20
OD_BCVA: 20/20

## 2025-08-29 ASSESSMENT — SUPERFICIAL PUNCTATE KERATITIS (SPK)
OD_SPK: T
OS_SPK: T

## 2025-08-29 ASSESSMENT — CONFRONTATIONAL VISUAL FIELD TEST (CVF)
OS_FINDINGS: FULL
OD_FINDINGS: FULL

## 2025-08-29 ASSESSMENT — REFRACTION_AUTOREFRACTION
OS_CYLINDER: -0.50
OD_CYLINDER: -0.75
OS_AXIS: 095
OD_AXIS: 089
OS_SPHERE: +0.75
OD_SPHERE: +1.50

## 2025-08-29 ASSESSMENT — TONOMETRY
OS_IOP_MMHG: 10
OD_IOP_MMHG: 10

## (undated) DEVICE — WARMING BLANKET FULL ADULT

## (undated) DEVICE — GOWN XXXL

## (undated) DEVICE — TUBING TUR 2 PRONG

## (undated) DEVICE — TUBING IRR SET FOR CYSTOSCOPY 77"

## (undated) DEVICE — VENODYNE/SCD SLEEVE CALF MEDIUM

## (undated) DEVICE — SUT VICRYL 2-0 36" CT-1

## (undated) DEVICE — TUBING CONNECTING 6MM 20FT

## (undated) DEVICE — SSH-ESU BOVIE MACHINE T7E14835DX: Type: DURABLE MEDICAL EQUIPMENT

## (undated) DEVICE — FOLEY TRAY 16FR 5CC LTX URINE METER TEMP SENSING CLOSED

## (undated) DEVICE — GLV 6.5 PROTEXIS (WHITE)

## (undated) DEVICE — DRSG DERMABOND 0.7ML

## (undated) DEVICE — PACK MINOR WITH LAP

## (undated) DEVICE — PACK LITHOTOMY